# Patient Record
Sex: FEMALE | Race: WHITE | NOT HISPANIC OR LATINO | Employment: UNEMPLOYED | URBAN - METROPOLITAN AREA
[De-identification: names, ages, dates, MRNs, and addresses within clinical notes are randomized per-mention and may not be internally consistent; named-entity substitution may affect disease eponyms.]

---

## 2018-03-27 ENCOUNTER — OFFICE VISIT (OUTPATIENT)
Dept: URGENT CARE | Facility: CLINIC | Age: 33
End: 2018-03-27

## 2018-03-27 VITALS
DIASTOLIC BLOOD PRESSURE: 88 MMHG | HEIGHT: 64 IN | TEMPERATURE: 97 F | BODY MASS INDEX: 39.27 KG/M2 | HEART RATE: 104 BPM | OXYGEN SATURATION: 98 % | RESPIRATION RATE: 18 BRPM | WEIGHT: 230 LBS | SYSTOLIC BLOOD PRESSURE: 136 MMHG

## 2018-03-27 DIAGNOSIS — M25.512 ACUTE PAIN OF LEFT SHOULDER: Primary | ICD-10-CM

## 2018-03-27 DIAGNOSIS — M75.02 ADHESIVE CAPSULITIS OF LEFT SHOULDER: ICD-10-CM

## 2018-03-27 PROCEDURE — 96372 THER/PROPH/DIAG INJ SC/IM: CPT | Mod: S$GLB,,, | Performed by: FAMILY MEDICINE

## 2018-03-27 PROCEDURE — 99203 OFFICE O/P NEW LOW 30 MIN: CPT | Mod: 25,S$GLB,, | Performed by: PHYSICIAN ASSISTANT

## 2018-03-27 RX ORDER — BETAMETHASONE SODIUM PHOSPHATE AND BETAMETHASONE ACETATE 3; 3 MG/ML; MG/ML
9 INJECTION, SUSPENSION INTRA-ARTICULAR; INTRALESIONAL; INTRAMUSCULAR; SOFT TISSUE
Status: COMPLETED | OUTPATIENT
Start: 2018-03-27 | End: 2018-03-27

## 2018-03-27 RX ORDER — NAPROXEN 500 MG/1
500 TABLET ORAL 2 TIMES DAILY WITH MEALS
Qty: 60 TABLET | Refills: 0 | Status: SHIPPED | OUTPATIENT
Start: 2018-03-27 | End: 2018-04-26

## 2018-03-27 RX ADMIN — BETAMETHASONE SODIUM PHOSPHATE AND BETAMETHASONE ACETATE 9 MG: 3; 3 INJECTION, SUSPENSION INTRA-ARTICULAR; INTRALESIONAL; INTRAMUSCULAR; SOFT TISSUE at 10:03

## 2018-03-27 NOTE — PROGRESS NOTES
"Subjective:       Patient ID: Reyna Chavez is a 32 y.o. female.    Vitals:  height is 5' 4" (1.626 m) and weight is 104.3 kg (230 lb). Her oral temperature is 97.4 °F (36.3 °C). Her blood pressure is 136/88 and her pulse is 104. Her respiration is 18 and oxygen saturation is 98%.     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 Over the course of the last week she has had worsening shoulder pain. She denies trauma or injury. She denies lifting anything heavy. She complains of radicular pain that shoots down the shoulder. She complains of occasional numbness and tingling in the middle, ring, and index finger of the left hand. She denies swelling, erythema, or ecchymosis. She is able to move the shoulder although she has significant pain when doing so. She denies past injuries. She has had pain in her shoulder previously, but it usually subsided within a few days.       Shoulder Pain    The pain is present in the left shoulder. This is a new problem. The current episode started in the past 7 days. There has been no history of extremity trauma. The problem occurs intermittently. The problem has been gradually worsening. The quality of the pain is described as sharp. The pain is at a severity of 7/10. The pain is moderate. Associated symptoms include joint swelling, a limited range of motion, numbness, stiffness and tingling. Pertinent negatives include no fever, headaches or visual symptoms. The symptoms are aggravated by activity. She has tried NSAIDS and cold for the symptoms. The treatment provided mild relief. There is no history of Injuries to Extremity.     Review of Systems   Constitution: Negative for chills and fever.   HENT: Negative for sore throat.    Eyes: Negative for blurred vision.   Cardiovascular: Negative for chest pain.   Respiratory: Negative for shortness of breath.    Skin: Negative for rash.   Musculoskeletal: Positive for joint pain, joint swelling and " stiffness. Negative for back pain.   Gastrointestinal: Negative for abdominal pain, diarrhea, nausea and vomiting.   Neurological: Positive for numbness and tingling. Negative for headaches.   Psychiatric/Behavioral: The patient is not nervous/anxious.        Objective:      Physical Exam   Constitutional: She is oriented to person, place, and time. She appears well-developed and well-nourished. She is cooperative.  Non-toxic appearance. She does not appear ill. No distress.   HENT:   Head: Normocephalic and atraumatic. Head is without abrasion, without contusion and without laceration.   Right Ear: Hearing, tympanic membrane, external ear and ear canal normal. No hemotympanum.   Left Ear: Hearing, tympanic membrane, external ear and ear canal normal. No hemotympanum.   Nose: Nose normal. No mucosal edema, rhinorrhea or nasal deformity. No epistaxis. Right sinus exhibits no maxillary sinus tenderness and no frontal sinus tenderness. Left sinus exhibits no maxillary sinus tenderness and no frontal sinus tenderness.   Mouth/Throat: Uvula is midline, oropharynx is clear and moist and mucous membranes are normal. No trismus in the jaw. Normal dentition. No uvula swelling. No posterior oropharyngeal erythema.   Eyes: Conjunctivae, EOM and lids are normal. Pupils are equal, round, and reactive to light. Right eye exhibits no discharge. Left eye exhibits no discharge. No scleral icterus.   Sclera clear bilat   Neck: Trachea normal, normal range of motion, full passive range of motion without pain and phonation normal. Neck supple. No spinous process tenderness and no muscular tenderness present. No neck rigidity. No tracheal deviation present.   Cardiovascular: Normal rate, regular rhythm, normal heart sounds, intact distal pulses and normal pulses.    Pulmonary/Chest: Effort normal and breath sounds normal. No respiratory distress.   Abdominal: Soft. Normal appearance and bowel sounds are normal. She exhibits no  distension, no pulsatile midline mass and no mass. There is no tenderness.   Musculoskeletal: She exhibits no edema or deformity.        Left shoulder: She exhibits tenderness, bony tenderness and pain. She exhibits normal range of motion, no swelling, no effusion, no crepitus, no deformity, no laceration, no spasm, normal pulse and normal strength.        Left elbow: She exhibits normal range of motion, no swelling, no effusion, no deformity and no laceration. No tenderness found. No radial head, no medial epicondyle, no lateral epicondyle and no olecranon process tenderness noted.        Left wrist: She exhibits normal range of motion, no tenderness, no bony tenderness, no swelling, no effusion, no crepitus, no deformity and no laceration.        Cervical back: She exhibits pain. She exhibits normal range of motion, no tenderness, no bony tenderness, no swelling, no edema, no deformity, no laceration, no spasm and normal pulse.        Back:         Left upper arm: She exhibits no tenderness, no bony tenderness, no swelling, no edema, no deformity and no laceration.        Left forearm: She exhibits no tenderness, no bony tenderness, no swelling, no edema, no deformity and no laceration.        Arms:       Left hand: She exhibits normal range of motion, no tenderness, no bony tenderness, normal two-point discrimination, normal capillary refill, no deformity, no laceration and no swelling. Normal sensation noted. Decreased sensation is not present in the ulnar distribution, is not present in the medial redistribution and is not present in the radial distribution. Normal strength noted. She exhibits no finger abduction, no thumb/finger opposition and no wrist extension trouble.   Full Cervical ROM with flexion, extension, lateral rotation, and lateral flexion. Pain elicited with extension and lateral flexion to the right.     Moderate TTP Left trapezius, scapular spine, AC joint, and proximal insertion of the biceps  tendon.     FULL ROM B UE WITH 5/5 STRENGTH  NVIT DISTALLY WITH SILT AND 2+BCR  Symmetric Sensation C5-T1 Bilaterally        Neurological: She is alert and oriented to person, place, and time. She has normal strength. No cranial nerve deficit or sensory deficit. She exhibits normal muscle tone. She displays no seizure activity. Coordination normal. GCS eye subscore is 4. GCS verbal subscore is 5. GCS motor subscore is 6.   Skin: Skin is warm, dry and intact. Capillary refill takes less than 2 seconds. No abrasion, no bruising, no burn, no ecchymosis and no laceration noted. She is not diaphoretic. No pallor.   Psychiatric: She has a normal mood and affect. Her speech is normal and behavior is normal. Judgment and thought content normal. Cognition and memory are normal.   Nursing note and vitals reviewed.        XRAY Cervical and Left shoulder: No acute fractures or dislocations noted. C-spine well aligned. No signs of impingement. The cervical vertebra are intact. The alignment shows mild reversal of the usual lordosis without subluxation.  Mild degenerative changes are present at the disc spaces C4-C7 with narrowing and small osteophytes. The prevertebral soft tissues are unremarkable.       Assessment:       1. Acute pain of left shoulder    2. Adhesive capsulitis of left shoulder        Plan:         Acute pain of left shoulder  -     X-Ray Shoulder 2 or More Views Left; Future; Expected date: 03/27/2018  -     X-Ray Cervical Spine 2 or 3 Views; Future; Expected date: 03/27/2018  -     Ambulatory referral to Orthopedics  -     betamethasone acetate-betamethasone sodium phosphate injection 9 mg; Inject 1.5 mLs (9 mg total) into the muscle one time.  -     naproxen (NAPROSYN) 500 MG tablet; Take 1 tablet (500 mg total) by mouth 2 (two) times daily with meals.  Dispense: 60 tablet; Refill: 0    Adhesive capsulitis of left shoulder    Based on examination and benign radiographs diagnosis is likely frozen shoulder in  the freezing stage. Discussed stages and treatment. Referred to ortho for follow up.       Understanding Frozen Shoulder    Frozen shoulder is a condition where the shoulder becomes painful and hard to move. The condition is sometimes called adhesive capsulitis.  The shoulder is a joint that is made up of many parts. These parts allow you to raise, rotate, and swing your arm. The parts of a normal shoulder are:  · Humeral head. The ball at the top of the upper arm bone (humerus).  · Scapula. The shoulder blade.  · Glenoid. The shallow socket on the scapula. (The humeral head rests on the glenoid.)  · Capsule. A sheet of tough tissue that encloses the joint and joins the ball to the socket.  With frozen shoulder, the capsule thickens, and shrinks and pulls in (contracts). It is not clear why this happens. It may be from swelling and irritation, or from scar tissue forming. Over time, this may result in pain, stiffness, and loss of movement in the shoulder.  What causes frozen shoulder?  Experts dont know for sure why frozen shoulder occurs. Some things can make the condition more likely. These include:  · Being a woman  · Being 40 to 60 years old  · Having certain health conditions, such as diabetes or thyroid disease  · Taking certain medicines  · Not using the shoulder for a prolonged period of time, such as after an injury or surgery  Symptoms of frozen shoulder  Frozen shoulder typically occurs in 3 stages. Each stage will vary, but often lasts a few months or longer:  · Freezing stage. The shoulder is very painful. Pain often gets worse when moving your arm and at night during sleep. The shoulder gradually becomes stiffer.  · Frozen stage. The shoulder is very stiff and hard to move. Pain may be less than in the first stage. It may be hard to do daily tasks, such as dressing or bathing.  · Thawing stage. Pain and stiffness slowly get better. In time, normal or almost normal use of the shoulder  returns.  Treatment for frozen shoulder  Most cases of frozen shoulder get better, even with no treatment. Treatment is done to help speed healing and help regain as much joint movement as possible. The best course of treatment will depend on your needs. It may include one or more of the following:  · Prescription or over-the-counter pain medicines. These help relieve pain and reduce swelling.  · Stretching exercises. These help restore movement to the shoulder. You may do them on your own or under the care of a physical therapist.  · Cortisone shots. These are given into your shoulder joint. They cant cure frozen shoulder. But they can help give short-term relief from symptoms and allow you to do exercises without too much pain.  · Cold packs and heat packs. These can help relieve symptoms.  Keep in mind that getting better is a slow process. It can take a year or longer. If your shoulder doesnt get better on its own in this time, you may need surgery. This is done to loosen the tight tissues in the shoulder joint.  When to call your healthcare provider  Call your healthcare provider right away if you have any of these:  · Fever of 100.4°F (38°C) or higher, or as directed  · Symptoms that dont get better, or get worse  · New symptoms   Date Last Reviewed: 3/10/2016  © 0203-3544 [employer]. [address] [place] [address], [place] [address]. All rights reserved. This information is not intended as a substitute for professional medical care. Always follow your healthcare professional's instructions.      Please follow up with your Primary care provider within 2-5 days if your signs and symptoms have not resolved or worsen.     If your condition worsens or fails to improve we recommend that you receive another evaluation at the emergency room immediately or contact your primary medical clinic to discuss your concerns.   You must understand that you have received an Urgent Care treatment only and that you may  be released before all of your medical problems are known or treated. You, the patient, will arrange for follow up care as instructed.

## 2018-03-27 NOTE — PATIENT INSTRUCTIONS
Understanding Frozen Shoulder    Frozen shoulder is a condition where the shoulder becomes painful and hard to move. The condition is sometimes called adhesive capsulitis.  The shoulder is a joint that is made up of many parts. These parts allow you to raise, rotate, and swing your arm. The parts of a normal shoulder are:  · Humeral head. The ball at the top of the upper arm bone (humerus).  · Scapula. The shoulder blade.  · Glenoid. The shallow socket on the scapula. (The humeral head rests on the glenoid.)  · Capsule. A sheet of tough tissue that encloses the joint and joins the ball to the socket.  With frozen shoulder, the capsule thickens, and shrinks and pulls in (contracts). It is not clear why this happens. It may be from swelling and irritation, or from scar tissue forming. Over time, this may result in pain, stiffness, and loss of movement in the shoulder.  What causes frozen shoulder?  Experts dont know for sure why frozen shoulder occurs. Some things can make the condition more likely. These include:  · Being a woman  · Being 40 to 60 years old  · Having certain health conditions, such as diabetes or thyroid disease  · Taking certain medicines  · Not using the shoulder for a prolonged period of time, such as after an injury or surgery  Symptoms of frozen shoulder  Frozen shoulder typically occurs in 3 stages. Each stage will vary, but often lasts a few months or longer:  · Freezing stage. The shoulder is very painful. Pain often gets worse when moving your arm and at night during sleep. The shoulder gradually becomes stiffer.  · Frozen stage. The shoulder is very stiff and hard to move. Pain may be less than in the first stage. It may be hard to do daily tasks, such as dressing or bathing.  · Thawing stage. Pain and stiffness slowly get better. In time, normal or almost normal use of the shoulder returns.  Treatment for frozen shoulder  Most cases of frozen shoulder get better, even with no treatment.  Treatment is done to help speed healing and help regain as much joint movement as possible. The best course of treatment will depend on your needs. It may include one or more of the following:  · Prescription or over-the-counter pain medicines. These help relieve pain and reduce swelling.  · Stretching exercises. These help restore movement to the shoulder. You may do them on your own or under the care of a physical therapist.  · Cortisone shots. These are given into your shoulder joint. They cant cure frozen shoulder. But they can help give short-term relief from symptoms and allow you to do exercises without too much pain.  · Cold packs and heat packs. These can help relieve symptoms.  Keep in mind that getting better is a slow process. It can take a year or longer. If your shoulder doesnt get better on its own in this time, you may need surgery. This is done to loosen the tight tissues in the shoulder joint.  When to call your healthcare provider  Call your healthcare provider right away if you have any of these:  · Fever of 100.4°F (38°C) or higher, or as directed  · Symptoms that dont get better, or get worse  · New symptoms   Date Last Reviewed: 3/10/2016  © 7857-5307 [employer]. [address] [place] [address], [place] [address]. All rights reserved. This information is not intended as a substitute for professional medical care. Always follow your healthcare professional's instructions.      Please follow up with your Primary care provider within 2-5 days if your signs and symptoms have not resolved or worsen.     If your condition worsens or fails to improve we recommend that you receive another evaluation at the emergency room immediately or contact your primary medical clinic to discuss your concerns.   You must understand that you have received an Urgent Care treatment only and that you may be released before all of your medical problems are known or treated. You, the patient, will arrange for  follow up care as instructed.

## 2018-03-30 ENCOUNTER — TELEPHONE (OUTPATIENT)
Dept: URGENT CARE | Facility: CLINIC | Age: 33
End: 2018-03-30

## 2020-09-04 ENCOUNTER — TELEPHONE (OUTPATIENT)
Dept: OBSTETRICS AND GYNECOLOGY | Facility: CLINIC | Age: 35
End: 2020-09-04

## 2020-09-10 ENCOUNTER — OFFICE VISIT (OUTPATIENT)
Dept: OBSTETRICS AND GYNECOLOGY | Facility: CLINIC | Age: 35
End: 2020-09-10
Payer: MEDICAID

## 2020-09-10 VITALS
DIASTOLIC BLOOD PRESSURE: 64 MMHG | HEIGHT: 62 IN | BODY MASS INDEX: 34.2 KG/M2 | SYSTOLIC BLOOD PRESSURE: 116 MMHG | WEIGHT: 185.88 LBS

## 2020-09-10 DIAGNOSIS — Z72.0 TOBACCO ABUSE DISORDER: ICD-10-CM

## 2020-09-10 DIAGNOSIS — Z3A.20 20 WEEKS GESTATION OF PREGNANCY: ICD-10-CM

## 2020-09-10 DIAGNOSIS — O09.522 SUPERVISION OF ELDERLY MULTIGRAVIDA, SECOND TRIMESTER: Primary | ICD-10-CM

## 2020-09-10 DIAGNOSIS — Z12.4 SCREENING FOR MALIGNANT NEOPLASM OF CERVIX: ICD-10-CM

## 2020-09-10 DIAGNOSIS — O09.522 ADVANCED MATERNAL AGE IN MULTIGRAVIDA, SECOND TRIMESTER: ICD-10-CM

## 2020-09-10 DIAGNOSIS — Z11.3 SCREENING EXAMINATION FOR STD (SEXUALLY TRANSMITTED DISEASE): ICD-10-CM

## 2020-09-10 PROCEDURE — 87491 CHLMYD TRACH DNA AMP PROBE: CPT

## 2020-09-10 PROCEDURE — 99214 PR OFFICE/OUTPT VISIT, EST, LEVL IV, 30-39 MIN: ICD-10-PCS | Mod: TH,S$PBB,, | Performed by: OBSTETRICS & GYNECOLOGY

## 2020-09-10 PROCEDURE — 88141 CYTOPATH C/V INTERPRET: CPT | Mod: ,,, | Performed by: PATHOLOGY

## 2020-09-10 PROCEDURE — 87624 HPV HI-RISK TYP POOLED RSLT: CPT

## 2020-09-10 PROCEDURE — 99999 PR PBB SHADOW E&M-EST. PATIENT-LVL III: ICD-10-PCS | Mod: PBBFAC,,, | Performed by: OBSTETRICS & GYNECOLOGY

## 2020-09-10 PROCEDURE — 99214 OFFICE O/P EST MOD 30 MIN: CPT | Mod: TH,S$PBB,, | Performed by: OBSTETRICS & GYNECOLOGY

## 2020-09-10 PROCEDURE — 88141 PR  CYTOPATH CERV/VAG INTERPRET: ICD-10-PCS | Mod: ,,, | Performed by: PATHOLOGY

## 2020-09-10 PROCEDURE — 99213 OFFICE O/P EST LOW 20 MIN: CPT | Mod: PBBFAC,TH,PN | Performed by: OBSTETRICS & GYNECOLOGY

## 2020-09-10 PROCEDURE — 99999 PR PBB SHADOW E&M-EST. PATIENT-LVL III: CPT | Mod: PBBFAC,,, | Performed by: OBSTETRICS & GYNECOLOGY

## 2020-09-10 PROCEDURE — 87086 URINE CULTURE/COLONY COUNT: CPT

## 2020-09-10 PROCEDURE — 88175 CYTOPATH C/V AUTO FLUID REDO: CPT | Performed by: PATHOLOGY

## 2020-09-10 NOTE — PROGRESS NOTES
Subjective:      Reyna Chavez is being seen today for her first obstetrical visit. She is at Unknown gestation. Her obstetrical history is significant for advanced maternal age, obesity, smoker and Late prenatal care. Pregnancy history fully reviewed.    Review of Systems  Pertinent items are noted in HPI.      Objective:      General appearance: alert, appears stated age and cooperative  Abdomen: soft, non-tender; bowel sounds normal; no masses,  no organomegaly  Pelvic: cervix normal in appearance, external genitalia normal, no adnexal masses or tenderness, no cervical motion tenderness, rectovaginal septum normal, uterus normal size, shape, and consistency and vagina normal without discharge      Assessment:      Pregnancy at 20 and 2/7 weeks  by LMP, Fundal Height 24wga. FHR noted with doppler.   LMP 4/21/20 9/11/20 US: 20.2wga   EDC 1/26/21     Plan:      Initial labs drawn.  Prenatal vitamins.  Problem list reviewed and updated.  Role of ultrasound in pregnancy discussed; fetal survey: ordered.  Tobacco Abuse counseling  AMA: will need to offer MT21 at next visit  Follow up in 4 weeks.

## 2020-09-10 NOTE — PATIENT INSTRUCTIONS
Adapting to Pregnancy: Second Trimester  Keep up the healthy habits you started in your first trimester. You might be a little more tired than normal. So plan your day wisely. Look at the tips below and choose the ones that suit your lifestyle.    If you have any questions, check with your healthcare provider.   If you work  If you can, adjust your work with your employer to fit your needs. Try these tips:  · If you stand for long periods, find ways to do some tasks while sitting. Also, try to stand with 1 foot resting on a low stool or ledge. Shift your weight from foot to foot often. Wear low-heeled shoes.  · If you sit, keep your knees level with your hips. Rest your feet on a firm surface. Sit tall with support for your low back.  · If you work long hours, ask about adjusting your schedule. Try taking shorter breaks more often.  When you travel  The second trimester may be the best time for any travel. Talk to your healthcare provider about any special plans you may need to make. Always:  · Wear a seat belt. Fasten the lap part under your belly. Wear the shoulder part also.  · Take breaks often during long trips by car or plane. Move around to stretch your legs.  · Drink plenty of fluids on flights. The air in plane cabins is very dry.  · Avoid hot climates or high altitudes if you are not used to them.  · Avoid places where the food and water might make you sick.  · Make sure you are up-to-date on all immunizations, including the flu vaccine. This is especially important when traveling overseas.  Taking time to relax  Find time to rest and relax at work or at home:  · Take short time-outs daily. Do relaxation exercises.  · Breathe deeply during stressful times.  · Try not to take on too much. Plan tasks for times when you have the most energy.  · Take naps when you can. Or just sit and relax.  · After week 16, avoid lying on your back for more than a few minutes. Instead, lie on your side. Switch sides  often.  Continuing as lovers  Unless your healthcare provider tells you otherwise, there is no reason to stop having sex now. Blood supply increases to the pelvic area in the second trimester. Because of this, sex might be more enjoyable. Try different positions and see whats best. Also, talk to your partner about any changes in desire. Spotting may happen after sex. Be sure to let your healthcare provider know if there is heavy bleeding.  Keeping your environment safe  You can still clean house and use scented products. Just take some simple precautions:  · Wear gloves when using cleaning fluids.  · Open windows to let in fresh air. Use a fan if you paint.  · Avoid secondhand smoke.  · Dont breathe fumes from nail polish, hair spray, cleansers, or other chemicals.  Date Last Reviewed: 8/16/2015  © 1281-2195 [employer]. [address] [place] [address], [place] [address]. All rights reserved. This information is not intended as a substitute for professional medical care. Always follow your healthcare professional's instructions.

## 2020-09-11 ENCOUNTER — HOSPITAL ENCOUNTER (OUTPATIENT)
Dept: RADIOLOGY | Facility: HOSPITAL | Age: 35
Discharge: HOME | End: 2020-09-11
Attending: OBSTETRICS & GYNECOLOGY
Payer: MEDICAID

## 2020-09-11 DIAGNOSIS — O09.522 SUPERVISION OF ELDERLY MULTIGRAVIDA, SECOND TRIMESTER: ICD-10-CM

## 2020-09-11 PROCEDURE — 76805 OB US >/= 14 WKS SNGL FETUS: CPT | Mod: 26,,, | Performed by: RADIOLOGY

## 2020-09-11 PROCEDURE — 76805 US OB 14+ WEEKS, TRANSABDOM, SINGLE GESTATION: ICD-10-PCS | Mod: 26,,, | Performed by: RADIOLOGY

## 2020-09-11 PROCEDURE — 76805 OB US >/= 14 WKS SNGL FETUS: CPT | Mod: TC,PN

## 2020-09-12 LAB
BACTERIA UR CULT: NORMAL
BACTERIA UR CULT: NORMAL

## 2020-09-15 LAB
HPV HR 12 DNA SPEC QL NAA+PROBE: POSITIVE
HPV16 AG SPEC QL: NEGATIVE
HPV18 DNA SPEC QL NAA+PROBE: NEGATIVE

## 2020-09-25 LAB
FINAL PATHOLOGIC DIAGNOSIS: ABNORMAL
Lab: ABNORMAL

## 2020-09-27 PROBLEM — O09.899 HISTORY OF MATERNAL CHLAMYDIA INFECTION, CURRENTLY PREGNANT: Status: ACTIVE | Noted: 2020-09-27

## 2020-09-27 PROBLEM — R87.611 ATYPICAL SQUAMOUS CELLS CANNOT EXCLUDE HIGH GRADE SQUAMOUS INTRAEPITHELIAL LESION ON CYTOLOGIC SMEAR OF CERVIX (ASC-H): Status: ACTIVE | Noted: 2020-09-27

## 2020-09-27 PROBLEM — B97.7 HPV IN FEMALE: Status: ACTIVE | Noted: 2020-09-27

## 2020-10-06 LAB
C TRACH DNA SPEC QL NAA+PROBE: DETECTED
N GONORRHOEA DNA SPEC QL NAA+PROBE: NOT DETECTED

## 2020-10-08 ENCOUNTER — TELEPHONE (OUTPATIENT)
Dept: OBSTETRICS AND GYNECOLOGY | Facility: CLINIC | Age: 35
End: 2020-10-08

## 2020-10-08 NOTE — TELEPHONE ENCOUNTER
----- Message from Caren Skinner sent at 10/8/2020 10:30 AM CDT -----  Contact: call pt [phone]    Type: Needs Medical Advice  Who Called:  pt  Best Call Back Number:call pt [phone]  Additional Information:    pt  missed her blood  wk and   DR prdao   due to  car  problems // pt rescheduled her bertin // when  trying to  reschedule  her  blood wk a  block  came up and  was unable to move  forward // please call  for details

## 2020-10-15 ENCOUNTER — TELEPHONE (OUTPATIENT)
Dept: OBSTETRICS AND GYNECOLOGY | Facility: CLINIC | Age: 35
End: 2020-10-15

## 2020-10-15 NOTE — TELEPHONE ENCOUNTER
Spoke with patient and informed of need to be fasting for blood work.  Patient verbalized understanding.

## 2020-10-15 NOTE — TELEPHONE ENCOUNTER
----- Message from Caren Skinner sent at 10/15/2020  9:05 AM CDT -----  Contact: call  [phone]    Type: Needs Medical Advice  Who Called:  pt  Best Call Back Number: call  [phone]  Additional Information:   pt is  calling to  make  sure   all her blood wk order   or  fasting  or  non  fasting  /and  if all  of them  were  put in the computer/ please call  // please call for all details

## 2020-10-21 ENCOUNTER — TELEPHONE (OUTPATIENT)
Dept: OBSTETRICS AND GYNECOLOGY | Facility: CLINIC | Age: 35
End: 2020-10-21

## 2020-10-21 NOTE — TELEPHONE ENCOUNTER
----- Message from Oxanaallie Thrasher sent at 10/21/2020 12:14 PM CDT -----  Contact: pt at [phone]  Type:  Sooner Apoointment Request    Caller is requesting a sooner appointment.  Caller declined first available appointment listed below.  Caller will not accept being placed on the waitlist and is requesting a message be sent to doctor.    Name of Caller:  patient  When is the first available appointment?  11/4  Symptoms:  routine  Best Call Back Number: [phone]  Additional Information: Patient missed appt for today thought it was for 10/22.Please call back and advise.

## 2020-11-02 ENCOUNTER — TELEPHONE (OUTPATIENT)
Dept: OBSTETRICS AND GYNECOLOGY | Facility: CLINIC | Age: 35
End: 2020-11-02

## 2020-11-02 ENCOUNTER — LAB VISIT (OUTPATIENT)
Dept: LAB | Facility: HOSPITAL | Age: 35
End: 2020-11-02
Attending: OBSTETRICS & GYNECOLOGY
Payer: MEDICAID

## 2020-11-02 DIAGNOSIS — Z3A.20 20 WEEKS GESTATION OF PREGNANCY: ICD-10-CM

## 2020-11-02 DIAGNOSIS — Z3A.20 20 WEEKS GESTATION OF PREGNANCY: Primary | ICD-10-CM

## 2020-11-02 DIAGNOSIS — O09.522 SUPERVISION OF ELDERLY MULTIGRAVIDA, SECOND TRIMESTER: ICD-10-CM

## 2020-11-02 LAB
BASOPHILS # BLD AUTO: 0.04 K/UL (ref 0–0.2)
BASOPHILS NFR BLD: 0.3 % (ref 0–1.9)
DIFFERENTIAL METHOD: ABNORMAL
EOSINOPHIL # BLD AUTO: 0.3 K/UL (ref 0–0.5)
EOSINOPHIL NFR BLD: 1.7 % (ref 0–8)
ERYTHROCYTE [DISTWIDTH] IN BLOOD BY AUTOMATED COUNT: 12.9 % (ref 11.5–14.5)
ESTIMATED AVG GLUCOSE: 80 MG/DL (ref 68–131)
HBA1C MFR BLD HPLC: 4.4 % (ref 4–5.6)
HCT VFR BLD AUTO: 34.8 % (ref 37–48.5)
HGB BLD-MCNC: 11.1 G/DL (ref 12–16)
IMM GRANULOCYTES # BLD AUTO: 0.07 K/UL (ref 0–0.04)
IMM GRANULOCYTES NFR BLD AUTO: 0.5 % (ref 0–0.5)
LYMPHOCYTES # BLD AUTO: 2.2 K/UL (ref 1–4.8)
LYMPHOCYTES NFR BLD: 14.4 % (ref 18–48)
MCH RBC QN AUTO: 31.1 PG (ref 27–31)
MCHC RBC AUTO-ENTMCNC: 31.9 G/DL (ref 32–36)
MCV RBC AUTO: 98 FL (ref 82–98)
MONOCYTES # BLD AUTO: 0.7 K/UL (ref 0.3–1)
MONOCYTES NFR BLD: 4.7 % (ref 4–15)
NEUTROPHILS # BLD AUTO: 12.1 K/UL (ref 1.8–7.7)
NEUTROPHILS NFR BLD: 78.4 % (ref 38–73)
NRBC BLD-RTO: 0 /100 WBC
PLATELET # BLD AUTO: 371 K/UL (ref 150–350)
PMV BLD AUTO: 11.2 FL (ref 9.2–12.9)
RBC # BLD AUTO: 3.57 M/UL (ref 4–5.4)
WBC # BLD AUTO: 15.44 K/UL (ref 3.9–12.7)

## 2020-11-02 PROCEDURE — 86850 RBC ANTIBODY SCREEN: CPT

## 2020-11-02 PROCEDURE — 86703 HIV-1/HIV-2 1 RESULT ANTBDY: CPT

## 2020-11-02 PROCEDURE — 84443 ASSAY THYROID STIM HORMONE: CPT

## 2020-11-02 PROCEDURE — 36415 COLL VENOUS BLD VENIPUNCTURE: CPT | Mod: PO

## 2020-11-02 PROCEDURE — 87340 HEPATITIS B SURFACE AG IA: CPT

## 2020-11-02 PROCEDURE — 85025 COMPLETE CBC W/AUTO DIFF WBC: CPT

## 2020-11-02 PROCEDURE — 86762 RUBELLA ANTIBODY: CPT

## 2020-11-02 PROCEDURE — 82947 ASSAY GLUCOSE BLOOD QUANT: CPT

## 2020-11-02 PROCEDURE — 83036 HEMOGLOBIN GLYCOSYLATED A1C: CPT

## 2020-11-02 PROCEDURE — 86592 SYPHILIS TEST NON-TREP QUAL: CPT

## 2020-11-02 NOTE — TELEPHONE ENCOUNTER
Lubna with [facility] called and stated that patient was noncompliant with 1 hour glucose screening and asked if a random glucose can be drawn. After speaking with Dr. Gama, random glucose and hemoglobin A1C was ordered.

## 2020-11-02 NOTE — TELEPHONE ENCOUNTER
----- Message from Lubna Moran sent at 11/2/2020  4:01 PM CST -----  Regarding: Glucose OB screen  Good afternoon,    Reyna Chavez came in for bloodwork today and did not want to do the OB glucose screen due to having to wait an hour after drinking the glucola. I had to cancel that test. I advised her to contact her OBGYN for further instructions on how to proceed. If the test is needed, the orders will need to be placed in the chart again.          Thanks,    Lubna Moran  [facility]  Ext. [phone]

## 2020-11-03 LAB
ABO + RH BLD: NORMAL
BLD GP AB SCN CELLS X3 SERPL QL: NORMAL
GLUCOSE SERPL-MCNC: 65 MG/DL (ref 70–110)
HBV SURFACE AG SERPL QL IA: NEGATIVE
HIV 1+2 AB+HIV1 P24 AG SERPL QL IA: NEGATIVE
RPR SER QL: NORMAL
RUBV IGG SER-ACNC: 25.9 IU/ML
RUBV IGG SER-IMP: REACTIVE
TSH SERPL DL<=0.005 MIU/L-ACNC: 2.44 UIU/ML (ref 0.4–4)

## 2021-05-12 ENCOUNTER — PATIENT MESSAGE (OUTPATIENT)
Dept: RESEARCH | Facility: HOSPITAL | Age: 36
End: 2021-05-12

## 2021-07-01 ENCOUNTER — PATIENT MESSAGE (OUTPATIENT)
Dept: ADMINISTRATIVE | Facility: OTHER | Age: 36
End: 2021-07-01

## 2021-09-06 PROBLEM — O09.899 HISTORY OF MATERNAL CHLAMYDIA INFECTION, CURRENTLY PREGNANT: Status: RESOLVED | Noted: 2020-09-27 | Resolved: 2021-09-06
